# Patient Record
Sex: MALE | Race: WHITE | Employment: OTHER | URBAN - METROPOLITAN AREA
[De-identification: names, ages, dates, MRNs, and addresses within clinical notes are randomized per-mention and may not be internally consistent; named-entity substitution may affect disease eponyms.]

---

## 2019-01-01 ENCOUNTER — APPOINTMENT (OUTPATIENT)
Dept: GENERAL RADIOLOGY | Facility: HOSPITAL | Age: 84
End: 2019-01-01
Attending: EMERGENCY MEDICINE
Payer: MEDICARE

## 2019-01-01 ENCOUNTER — HOSPITAL ENCOUNTER (EMERGENCY)
Facility: HOSPITAL | Age: 84
End: 2019-01-01
Attending: EMERGENCY MEDICINE
Payer: MEDICARE

## 2019-01-01 VITALS
RESPIRATION RATE: 33 BRPM | HEART RATE: 118 BPM | DIASTOLIC BLOOD PRESSURE: 81 MMHG | SYSTOLIC BLOOD PRESSURE: 97 MMHG | TEMPERATURE: 103 F | WEIGHT: 150 LBS

## 2019-01-01 DIAGNOSIS — I46.9 CARDIAC ARREST (HCC): Primary | ICD-10-CM

## 2019-01-01 LAB
BACTERIA UR QL AUTO: NEGATIVE /HPF
BILIRUB UR QL: NEGATIVE
CHOLEST SMN-MCNC: <50 MG/DL (ref ?–200)
COLOR UR: YELLOW
GLUCOSE UR-MCNC: NEGATIVE MG/DL
GRAN CASTS #/AREA UR COMP ASSIST: 9 /LPF
HDLC SERPL-MCNC: 10 MG/DL (ref 40–59)
HYALINE CASTS #/AREA URNS AUTO: 4 /LPF
KETONES UR-MCNC: NEGATIVE MG/DL
LACTATE SERPL-SCNC: 4.9 MMOL/L (ref 0.4–2)
LEUKOCYTE ESTERASE UR QL STRIP.AUTO: NEGATIVE
NITRITE UR QL STRIP.AUTO: NEGATIVE
PH UR: 5 [PH] (ref 5–8)
PROT UR-MCNC: 100 MG/DL
RBC #/AREA URNS AUTO: 4 /HPF
SP GR UR STRIP: 1.02 (ref 1–1.03)
TRIGL SERPL-MCNC: 153 MG/DL (ref 30–149)
TROPONIN I SERPL-MCNC: 23.9 NG/ML (ref ?–0.04)
UROBILINOGEN UR STRIP-ACNC: 2
VIT C UR-MCNC: NEGATIVE MG/DL
VLDLC SERPL CALC-MCNC: 31 MG/DL (ref 0–30)
WBC #/AREA URNS AUTO: 1 /HPF

## 2019-01-01 PROCEDURE — 80061 LIPID PANEL: CPT | Performed by: EMERGENCY MEDICINE

## 2019-01-01 PROCEDURE — 93010 ELECTROCARDIOGRAM REPORT: CPT | Performed by: EMERGENCY MEDICINE

## 2019-01-01 PROCEDURE — 92950 HEART/LUNG RESUSCITATION CPR: CPT

## 2019-01-01 PROCEDURE — 83605 ASSAY OF LACTIC ACID: CPT | Performed by: EMERGENCY MEDICINE

## 2019-01-01 PROCEDURE — 83605 ASSAY OF LACTIC ACID: CPT

## 2019-01-01 PROCEDURE — 84484 ASSAY OF TROPONIN QUANT: CPT | Performed by: EMERGENCY MEDICINE

## 2019-01-01 PROCEDURE — 96360 HYDRATION IV INFUSION INIT: CPT

## 2019-01-01 PROCEDURE — 99285 EMERGENCY DEPT VISIT HI MDM: CPT

## 2019-01-01 PROCEDURE — 94002 VENT MGMT INPAT INIT DAY: CPT

## 2019-01-01 PROCEDURE — 93005 ELECTROCARDIOGRAM TRACING: CPT

## 2019-01-01 PROCEDURE — 81001 URINALYSIS AUTO W/SCOPE: CPT | Performed by: EMERGENCY MEDICINE

## 2019-07-27 NOTE — ED NOTES
[employer] contacted, pt not candidtate for donation d/t age.   Spoke with Ayo Hahn, reference # [id number]

## 2019-07-27 NOTE — ED NOTES
Spoke with [employer], notified of expiration who also spoke with  sender and pt son-jimmy. Awaiting return call back from .

## 2019-07-27 NOTE — ED NOTES
Spoke to Carl Mcclendon, pt released to [facility],  Able to extubate, remove tubes. Pt not released to  home at this time because they need doctor to sign death certificate.

## 2019-07-27 NOTE — PROGRESS NOTES
gave family time alone with patient then returned to family offering Scripture reading and prayer. Family left hospital ED at approximately 12:30. Family grieving appropriately.

## 2019-07-27 NOTE — PROGRESS NOTES
completed all paperwork. Body Release and Personal Belongings signed by son / Lucia Rao. Prem Commander will call in with  home. Card given with contact number.

## 2019-07-27 NOTE — ED PROVIDER NOTES
Patient Seen in: [facility] Emergency Department    History   Patient presents with:  Cardiac Arrest (cardiovascular)    Stated Complaint:     HPI    Patient is a 59-year-old male brought in by paramedics.   He is  currently in cardiac arrest and C TROPONIN I - Abnormal; Notable for the following components:    Troponin 23.900 (*)     All other components within normal limits   LIPID PANEL - Abnormal; Notable for the following components:    HDL Cholesterol 10 (*)     Triglycerides 153 (*)     VLDL